# Patient Record
Sex: MALE | Race: OTHER | URBAN - METROPOLITAN AREA
[De-identification: names, ages, dates, MRNs, and addresses within clinical notes are randomized per-mention and may not be internally consistent; named-entity substitution may affect disease eponyms.]

---

## 2017-01-11 ENCOUNTER — EMERGENCY (EMERGENCY)
Facility: HOSPITAL | Age: 27
LOS: 1 days | Discharge: HOME | End: 2017-01-11
Attending: EMERGENCY MEDICINE | Admitting: EMERGENCY MEDICINE
Payer: SELF-PAY

## 2017-01-11 VITALS
WEIGHT: 138.01 LBS | TEMPERATURE: 97 F | SYSTOLIC BLOOD PRESSURE: 121 MMHG | RESPIRATION RATE: 16 BRPM | OXYGEN SATURATION: 98 % | HEIGHT: 66 IN | DIASTOLIC BLOOD PRESSURE: 70 MMHG | HEART RATE: 78 BPM

## 2017-01-11 DIAGNOSIS — S61.412A LACERATION WITHOUT FOREIGN BODY OF LEFT HAND, INITIAL ENCOUNTER: ICD-10-CM
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
## 2017-01-11 DIAGNOSIS — Z23 ENCOUNTER FOR IMMUNIZATION: ICD-10-CM

## 2017-01-11 PROCEDURE — 99283 EMERGENCY DEPT VISIT LOW MDM: CPT | Mod: 25

## 2017-01-11 PROCEDURE — 12002 RPR S/N/AX/GEN/TRNK2.6-7.5CM: CPT

## 2017-01-11 PROCEDURE — 90471 IMMUNIZATION ADMIN: CPT

## 2017-01-11 PROCEDURE — 90715 TDAP VACCINE 7 YRS/> IM: CPT

## 2017-01-11 RX ORDER — ACETAMINOPHEN 500 MG
650 TABLET ORAL ONCE
Qty: 0 | Refills: 0 | Status: COMPLETED | OUTPATIENT
Start: 2017-01-11 | End: 2017-01-11

## 2017-01-11 RX ORDER — TETANUS TOXOID, REDUCED DIPHTHERIA TOXOID AND ACELLULAR PERTUSSIS VACCINE, ADSORBED 5; 2.5; 8; 8; 2.5 [IU]/.5ML; [IU]/.5ML; UG/.5ML; UG/.5ML; UG/.5ML
0.5 SUSPENSION INTRAMUSCULAR ONCE
Qty: 0 | Refills: 0 | Status: COMPLETED | OUTPATIENT
Start: 2017-01-11 | End: 2017-01-11

## 2017-01-11 RX ADMIN — TETANUS TOXOID, REDUCED DIPHTHERIA TOXOID AND ACELLULAR PERTUSSIS VACCINE, ADSORBED 0.5 MILLILITER(S): 5; 2.5; 8; 8; 2.5 SUSPENSION INTRAMUSCULAR at 10:59

## 2017-01-11 RX ADMIN — Medication 650 MILLIGRAM(S): at 11:01

## 2017-01-11 NOTE — ED PROVIDER NOTE - MEDICAL DECISION MAKING DETAILS
27 yo male with laceration to his left hand, palmar aspect. Lac. is superficial, linear, no active bleeding or foreign body noted. plan : tetanus booster, pain control, lac. repair, d/c home .

## 2017-01-11 NOTE — ED PROVIDER NOTE - SKIN, MLM
Skin normal color for race, warm, dry and intact. No evidence of rash.  linear superficial laceration 4 cm, to the left palm, no active bleeding, no foreign body

## 2017-01-11 NOTE — ED PROVIDER NOTE - MUSCULOSKELETAL, MLM
Spine appears normal, range of motion is not limited, no muscle or joint tenderness  left hand- NROM,

## 2017-01-11 NOTE — ED ADULT NURSE NOTE - CHPI ED SYMPTOMS NEG
no red streaks/no purulent drainage/no bleeding/no fever/no drainage/no vomiting/no bleeding at site

## 2017-01-11 NOTE — ED PROVIDER NOTE - OBJECTIVE STATEMENT
25 yo male accidentally sustained laceration to his left palm at work today. Pt reports that he was lifting a window, and sharp edge of the glass just cut his hand. Pt denies any numbness or tingling to his injured hand and able to move all his left fingers and hand well. Last tetanus booster-unknown. Pt has no other complaints. Bleeding stopped after he applied pressure.

## 2017-01-22 ENCOUNTER — EMERGENCY (EMERGENCY)
Facility: HOSPITAL | Age: 27
LOS: 1 days | Discharge: HOME | End: 2017-01-22
Attending: EMERGENCY MEDICINE | Admitting: EMERGENCY MEDICINE
Payer: OTHER MISCELLANEOUS

## 2017-01-22 VITALS
DIASTOLIC BLOOD PRESSURE: 73 MMHG | RESPIRATION RATE: 16 BRPM | HEART RATE: 88 BPM | SYSTOLIC BLOOD PRESSURE: 120 MMHG | OXYGEN SATURATION: 98 % | TEMPERATURE: 98 F | WEIGHT: 135.8 LBS

## 2017-01-22 DIAGNOSIS — S61.412D LACERATION WITHOUT FOREIGN BODY OF LEFT HAND, SUBSEQUENT ENCOUNTER: ICD-10-CM

## 2017-01-22 PROCEDURE — 99212 OFFICE O/P EST SF 10 MIN: CPT

## 2017-01-22 NOTE — ED PROVIDER NOTE - SKIN, MLM
L hand: 2 sutures - partially untied remain to palm, wound fully dehisced on, no swelling, no pus, no bleeding

## 2017-01-22 NOTE — ED PROVIDER NOTE - MEDICAL DECISION MAKING DETAILS
pt returns for suture removal - only 2 sutures remain in place rest fell out, wound dehisced but no signs of in, cleaned and dressed, wound care discussed, understand to limit hand use to promote healing - ace wrap applied to immobilize, f/u w/hand md , pt understands and agrees w/plan

## 2017-01-22 NOTE — ED PROVIDER NOTE - OBJECTIVE STATEMENT
The pt is a 25 y/o M, who returns to ED for suture removal - lac sutured 10 d ago. States that some stitches ripped open 2 d ago and wound has been draining pinkish fluid. Denies pain, pus, trauma, fever

## 2024-08-02 NOTE — ED ADULT TRIAGE NOTE - AS O2 DELIVERY
[facility] EMERGENCY DEPT  EMERGENCY DEPARTMENT HISTORY AND PHYSICAL EXAM      Date: 8/2/2024  Patient Name: Ace Weber  MRN: [id number]  Birthdate 2005  Date of evaluation: 8/2/2024  Provider: Jose Francisco Townsend MD  Note Started: 6:34 AM EDT 8/2/24    HISTORY OF PRESENT ILLNESS     Chief Complaint   Patient presents with    Hip Pain       History Provided By: Patient    HPI: Ace Weber is a 19 y.o. male.  Patient presents with right hip pain for the last 1 year.  Patient denies obvious injury.  Pain has been constant and mild to moderate degree with ambulation aggravating the pain.  Patient states that he hears clicking sound when he moves to the right hip.  Patient was seen at local ER recently had a negative pelvis/hip x-ray.  No back pain.  No other joint pain.  No paresthesia or motor dysfunction.  No radiation of pain.  No OTC treatment        PAST MEDICAL HISTORY   Past Medical History:  History reviewed. No pertinent past medical history.    Past Surgical History:  History reviewed. No pertinent surgical history.    Family History:  History reviewed. No pertinent family history.    Social History:  Social History     Tobacco Use    Smoking status: Never    Smokeless tobacco: Never   Substance Use Topics    Alcohol use: Never    Drug use: Never       Allergies:  No Known Allergies    PCP: No primary care provider on file.    Current Meds:   Current Facility-Administered Medications   Medication Dose Route Frequency Provider Last Rate Last Admin    acetaminophen (TYLENOL) tablet 1,000 mg  1,000 mg Oral NOW Jose Francisco Starkey MD        ibuprofen (ADVIL;MOTRIN) tablet 800 mg  800 mg Oral NOW Jose Francisco Starkey MD        predniSONE (DELTASONE) tablet 60 mg  60 mg Oral NOW Jose Francisco Starkey MD         Current Outpatient Medications   Medication Sig Dispense Refill    ibuprofen (ADVIL) 200 MG tablet Take 2 tablets by mouth every 8 hours as needed for Pain 20 tablet 0    acetaminophen (TYLENOL) 500 MG tablet Take 2 
room air

## 2024-08-08 NOTE — ED PROVIDER NOTE - NS ED MD DISPO DISCHARGE CCDA
Post-Care Instructions: I reviewed with the patient in detail post-care instructions. Patient is to wear sunprotection, and avoid picking at any of the treated lesions. Pt may apply Vaseline to crusted or scabbing areas. Render Note In Bullet Format When Appropriate: No Show Spray Paint Technique Variable?: Yes Spray Paint Text: The liquid nitrogen was applied to the skin utilizing a spray paint frosting technique. Medical Necessity Information: It is in your best interest to select a reason for this procedure from the list below. All of these items fulfill various CMS LCD requirements except the new and changing color options. Medical Necessity Clause: This procedure was medically necessary because the lesions that were treated were: Detail Level: Detailed Consent: The patient's consent was obtained including but not limited to risks of crusting, scabbing, blistering, scarring, darker or lighter pigmentary change, recurrence, incomplete removal and infection. Patient/Caregiver provided printed discharge information.
